# Patient Record
Sex: MALE | Race: WHITE | NOT HISPANIC OR LATINO | Employment: UNEMPLOYED | URBAN - METROPOLITAN AREA
[De-identification: names, ages, dates, MRNs, and addresses within clinical notes are randomized per-mention and may not be internally consistent; named-entity substitution may affect disease eponyms.]

---

## 2017-09-02 ENCOUNTER — HOSPITAL ENCOUNTER (EMERGENCY)
Facility: HOSPITAL | Age: 32
Discharge: HOME | End: 2017-09-02
Attending: EMERGENCY MEDICINE
Payer: MEDICAID

## 2017-09-02 VITALS
TEMPERATURE: 98 F | RESPIRATION RATE: 18 BRPM | WEIGHT: 182 LBS | SYSTOLIC BLOOD PRESSURE: 128 MMHG | HEART RATE: 83 BPM | HEIGHT: 72 IN | BODY MASS INDEX: 24.65 KG/M2 | DIASTOLIC BLOOD PRESSURE: 77 MMHG | OXYGEN SATURATION: 100 %

## 2017-09-02 DIAGNOSIS — R05.9 COUGH: ICD-10-CM

## 2017-09-02 DIAGNOSIS — F19.10 POLYSUBSTANCE ABUSE: Primary | ICD-10-CM

## 2017-09-02 LAB
ALBUMIN SERPL BCP-MCNC: 3.7 G/DL
ALP SERPL-CCNC: 212 U/L
ALT SERPL W/O P-5'-P-CCNC: 84 U/L
ANION GAP SERPL CALC-SCNC: 14 MMOL/L
APAP SERPL-MCNC: <3 UG/ML
AST SERPL-CCNC: 46 U/L
BASOPHILS # BLD AUTO: 0.03 K/UL
BASOPHILS NFR BLD: 0.3 %
BILIRUB SERPL-MCNC: 0.4 MG/DL
BUN SERPL-MCNC: 13 MG/DL
CALCIUM SERPL-MCNC: 9.4 MG/DL
CHLORIDE SERPL-SCNC: 101 MMOL/L
CO2 SERPL-SCNC: 23 MMOL/L
CREAT SERPL-MCNC: 0.9 MG/DL
DIFFERENTIAL METHOD: ABNORMAL
EOSINOPHIL # BLD AUTO: 0.2 K/UL
EOSINOPHIL NFR BLD: 1.8 %
ERYTHROCYTE [DISTWIDTH] IN BLOOD BY AUTOMATED COUNT: 13 %
EST. GFR  (AFRICAN AMERICAN): >60 ML/MIN/1.73 M^2
EST. GFR  (NON AFRICAN AMERICAN): >60 ML/MIN/1.73 M^2
ETHANOL SERPL-MCNC: <10 MG/DL
GLUCOSE SERPL-MCNC: 147 MG/DL
HCT VFR BLD AUTO: 38.6 %
HGB BLD-MCNC: 13.6 G/DL
LYMPHOCYTES # BLD AUTO: 3.2 K/UL
LYMPHOCYTES NFR BLD: 33.1 %
MCH RBC QN AUTO: 30.5 PG
MCHC RBC AUTO-ENTMCNC: 35.2 G/DL
MCV RBC AUTO: 87 FL
MONOCYTES # BLD AUTO: 0.7 K/UL
MONOCYTES NFR BLD: 6.7 %
NEUTROPHILS # BLD AUTO: 5.6 K/UL
NEUTROPHILS NFR BLD: 57.9 %
PLATELET # BLD AUTO: 310 K/UL
PMV BLD AUTO: 8.8 FL
POTASSIUM SERPL-SCNC: 3.8 MMOL/L
PROT SERPL-MCNC: 7.7 G/DL
RBC # BLD AUTO: 4.46 M/UL
SODIUM SERPL-SCNC: 138 MMOL/L
TSH SERPL DL<=0.005 MIU/L-ACNC: 0.49 UIU/ML
WBC # BLD AUTO: 9.73 K/UL

## 2017-09-02 PROCEDURE — 99284 EMERGENCY DEPT VISIT MOD MDM: CPT | Mod: ,,, | Performed by: EMERGENCY MEDICINE

## 2017-09-02 PROCEDURE — 94640 AIRWAY INHALATION TREATMENT: CPT

## 2017-09-02 PROCEDURE — 84443 ASSAY THYROID STIM HORMONE: CPT

## 2017-09-02 PROCEDURE — 85025 COMPLETE CBC W/AUTO DIFF WBC: CPT

## 2017-09-02 PROCEDURE — 80053 COMPREHEN METABOLIC PANEL: CPT

## 2017-09-02 PROCEDURE — 25000242 PHARM REV CODE 250 ALT 637 W/ HCPCS: Performed by: RADIOLOGY

## 2017-09-02 PROCEDURE — 99284 EMERGENCY DEPT VISIT MOD MDM: CPT

## 2017-09-02 PROCEDURE — 80329 ANALGESICS NON-OPIOID 1 OR 2: CPT

## 2017-09-02 PROCEDURE — 80320 DRUG SCREEN QUANTALCOHOLS: CPT

## 2017-09-02 RX ORDER — DICYCLOMINE HYDROCHLORIDE 20 MG/1
20 TABLET ORAL 2 TIMES DAILY
Qty: 30 TABLET | Refills: 1 | Status: SHIPPED | OUTPATIENT
Start: 2017-09-02 | End: 2017-10-02

## 2017-09-02 RX ORDER — IPRATROPIUM BROMIDE AND ALBUTEROL SULFATE 2.5; .5 MG/3ML; MG/3ML
3 SOLUTION RESPIRATORY (INHALATION)
Status: COMPLETED | OUTPATIENT
Start: 2017-09-02 | End: 2017-09-02

## 2017-09-02 RX ORDER — AMITRIPTYLINE HYDROCHLORIDE 50 MG/1
TABLET, FILM COATED ORAL NIGHTLY
COMMUNITY

## 2017-09-02 RX ORDER — LORAZEPAM 1 MG/1
1 TABLET ORAL 3 TIMES DAILY
COMMUNITY

## 2017-09-02 RX ORDER — CHLORDIAZEPOXIDE HYDROCHLORIDE 25 MG/1
50 CAPSULE, GELATIN COATED ORAL
Qty: 10 CAPSULE | Refills: 0 | Status: SHIPPED | OUTPATIENT
Start: 2017-09-03 | End: 2017-09-06

## 2017-09-02 RX ORDER — DIPHENHYDRAMINE HCL 25 MG
75 TABLET ORAL NIGHTLY
COMMUNITY

## 2017-09-02 RX ORDER — ONDANSETRON 4 MG/1
4 TABLET, FILM COATED ORAL EVERY 8 HOURS PRN
Qty: 18 TABLET | Refills: 0 | Status: SHIPPED | OUTPATIENT
Start: 2017-09-02

## 2017-09-02 RX ADMIN — IPRATROPIUM BROMIDE AND ALBUTEROL SULFATE 3 ML: .5; 3 SOLUTION RESPIRATORY (INHALATION) at 08:09

## 2017-09-03 NOTE — DISCHARGE INSTRUCTIONS
STOP USING HEROIN AND COCAINE IMMEDIATELY  DECREASE ALCOHOL INTAKE BY 1/2 EACH DAY   TAKE LIBRIUM ONLY IF YOU ARE NOT USING ALCOHOL   FOLLOW UP WITH REHAB FACILITY

## 2017-09-03 NOTE — ED PROVIDER NOTES
"Encounter Date: 9/2/2017       History     Chief Complaint   Patient presents with    Addiction Problem     Pt states that he needs to detox off of heroin. Last use was 1 hour ago. States that he had one beer today. Pt states that he has been mixing cocaine with the heroin. Pt states that he has thoughts of killing himself but that he would not "go through with it". Pt's mother states that she is concerned for his safety     Mr. Saurav Akhtar is a pleasant 32 year old male with past medical history significant for polysubstance abuse and major depressive disorder presents to the [facility] ED with a chief complaint of "wanting to detox from opiates". Patient states that he has recently taken cocaine, heroin, benzodiazepines and beer. He has abused these for about the past 8 months. Patient states that he is depressed over pain in his back and left knee as well as decreased mobility and not having a car. 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 Notably, patient denies suicidal ideation, homicidal ideation, visual or auditory hallucination. Patient states he cannot sleep without the help of benzodiazepine medication. Denies chest pain, fever, chills, abdominal pain, headache or dizziness. He endorses cough productive of yellow sputum and mild shortness of breath at rest. Patient smokes cigarettes.       The history is provided by the patient.     Review of patient's allergies indicates:  No Known Allergies  Past Medical History:   Diagnosis Date    Chronic back pain     Depression     Drug withdrawal seizure without complication     Opiate addiction      Past Surgical History:   Procedure Laterality Date    Left knee surgery      TESTICLE SURGERY       No family history on file.  Social History   Substance Use Topics    Smoking status: Current Every Day Smoker     Types: Cigarettes    Smokeless tobacco: Current User    Alcohol use Yes      Comment: Daily     Review of Systems   Constitutional: Negative " for chills, diaphoresis, fatigue and fever.   HENT: Negative for congestion, dental problem and sore throat.    Eyes: Negative for pain and redness.   Respiratory: Positive for cough and wheezing. Negative for apnea, chest tightness and stridor.    Cardiovascular: Negative for chest pain and palpitations.   Gastrointestinal: Negative for abdominal distention, abdominal pain, constipation, diarrhea and vomiting.   Endocrine: Negative for polyuria.   Genitourinary: Negative for dysuria.   Musculoskeletal: Positive for back pain and gait problem. Negative for joint swelling and neck pain.   Skin: Negative for rash and wound.   Allergic/Immunologic: Negative for immunocompromised state.   Neurological: Negative for dizziness and headaches.   Hematological: Negative for adenopathy.   Psychiatric/Behavioral: Positive for dysphoric mood and sleep disturbance. Negative for agitation, confusion, hallucinations, self-injury and suicidal ideas. The patient is not nervous/anxious.        Physical Exam     Initial Vitals [09/02/17 1933]   BP Pulse Resp Temp SpO2   128/77 (!) 115 18 98 °F (36.7 °C) 97 %      MAP       94         Physical Exam    Nursing note and vitals reviewed.  Constitutional: He appears well-developed and well-nourished. He is not diaphoretic. No distress.   HENT:   Head: Normocephalic and atraumatic.   Nose: Nose normal.   Mouth/Throat: No oropharyngeal exudate.   Eyes: EOM are normal. Pupils are equal, round, and reactive to light. No scleral icterus.   Neck: Normal range of motion. Neck supple. No JVD present.   Cardiovascular: Regular rhythm. Tachycardia present.  Exam reveals no friction rub.    No murmur heard.  Pulmonary/Chest: No respiratory distress. He has wheezes. He has no rales. He exhibits no tenderness.   Abdominal: Soft. Bowel sounds are normal. He exhibits no distension. There is no tenderness.   Musculoskeletal: He exhibits no edema or tenderness.        Left knee: He exhibits decreased range  of motion and deformity. He exhibits no laceration and no erythema. No tenderness found.   Lymphadenopathy:     He has no cervical adenopathy.   Neurological: He is alert and oriented to person, place, and time. He has normal strength.   Skin: Skin is warm and dry. No erythema.   Psychiatric: His speech is normal and behavior is normal. His mood appears not anxious. His affect is not angry and not inappropriate. He is not actively hallucinating. Thought content is not delusional. Cognition and memory are normal. He exhibits a depressed mood. He expresses no homicidal and no suicidal ideation. He expresses no suicidal plans and no homicidal plans.         ED Course   Procedures  Labs Reviewed   CBC W/ AUTO DIFFERENTIAL - Abnormal; Notable for the following:        Result Value    RBC 4.46 (*)     Hemoglobin 13.6 (*)     Hematocrit 38.6 (*)     MPV 8.8 (*)     All other components within normal limits    Narrative:     CAMPO NOT ON ICE   COMPREHENSIVE METABOLIC PANEL - Abnormal; Notable for the following:     Glucose 147 (*)     Alkaline Phosphatase 212 (*)     AST 46 (*)     ALT 84 (*)     All other components within normal limits    Narrative:     CAMPO NOT ON ICE   ACETAMINOPHEN LEVEL - Abnormal; Notable for the following:     Acetaminophen (Tylenol), Serum <3.0 (*)     All other components within normal limits    Narrative:     GRAY NOT ON ICE   TSH    Narrative:     CAMPO NOT ON ICE   ALCOHOL,MEDICAL (ETHANOL)    Narrative:     CAMPO NOT ON ICE             Medical Decision Making:   Initial Assessment:   Emergent evaluation of a 32 year old male with aforementioned past medical history presenting with depression and polysubstance abuse; patient seeking detox from drugs including opiates. At this time patient demonstrates no desire to hurt himself or others and does not appear to be responding to internal stimuli. Denies hallucinations of any kind. I have no reason to doubt the patient at this time. Patient also with  complaint of productive cough and wheezing. It is possible that this individual has a pneumonia and/or asthma.   Differential Diagnosis:   Major depressive disorder, polysubstance abuse.  Clinical Tests:   Lab Tests: Ordered  Radiological Study: Ordered  ED Management:  I will order a psychiatric workup for this patient including CBC, CMP, TSH, UA, drug panel, ethanol level, tylenol level, CXR and will order a DuoNeb for this patient's wheezing. I will reassess the patient. No need for a PEC at this time.  Other:   I discussed test(s) with the performing physician.       APC / Resident Notes:   9:21 PM  Labs have returned and appear unremarkable. Patient will be discharged with a prescription for Zofran prn, Bentyl and a Librium taper. Patient will follow up with outpatient pysch/rehab. Plan of care communicated with the patient and family.         Attending Attestation:   Physician Attestation Statement for Resident:  As the supervising MD   Physician Attestation Statement: I have personally seen and examined this patient.   I agree with the above history. -:   As the supervising MD I agree with the above PE.    As the supervising MD I agree with the above treatment, course, plan, and disposition.  I have reviewed and agree with the residents interpretation of the following: lab data.  I have reviewed the following: old records at this facility.            Attending ED Notes:   Patient is evaluated with his mother for long-standing polysubstance abuse.  There has been no suicidal gestures.  The mother does not feel that the patient is a threat to himself specifically, outside of his long-standing drug abuse.  He has been to detox multiple times in a rehabilitation facilities multiple times.  He is currently using heroin, cocaine, alcohol and benzodiazepines.  He has used Suboxone in the past.  I have had a long discussion with the patient and his mother.  I do not feel he meets PEC criteria.  I have discussed  alcohol withdrawal.,  He has never had prior issues with a complicated alcohol withdrawal.  I will write a Librium taper prescription, but informed the mother that if she continues to use alcohol and takes a Librium this could result in life threatening consequences of respiratory depression.  I do not get the impression that the patient himself is significantly committed to sobriety. Outpatient resources provided.           ED Course      Clinical Impression:   The primary encounter diagnosis was Polysubstance abuse. A diagnosis of Cough was also pertinent to this visit.    Disposition:   Disposition: Discharged  Condition: Stable                        Jem Hoskins MD  Resident  09/02/17 2237       Amy Vazquez MD  09/04/17 4236

## 2017-09-03 NOTE — ED TRIAGE NOTES
Pt to ER for assistance with detox from heroin, last used approximately 1 hour before arrival to ER. Pt also expressed concern for worsening depression. Denies SI or HI

## 2017-09-03 NOTE — ED NOTES
Pt is expressing concerns about having a code watch called. Pt states that he is not suicidal. Pt states that he does not want to go to the psych [facility]uj[facility]. Pt states that he is not crazy, he is not going to kill himself and the his mother should not have said anything about him being a safety risk. Pt states that he is concerned about how this is being handled. States that he would would like to go to another hospital. Pt informed that he cannot leave at this time. Security and the Charge Nurse notified.

## 2017-09-03 NOTE — ED PROVIDER NOTES
"Encounter Date: 9/2/2017       History     Chief Complaint   Patient presents with    Addiction Problem     Pt states that he needs to detox off of heroin. Last use was 1 hour ago. States that he had one beer today. Pt states that he has been mixing cocaine with the heroin. Pt states that he has thoughts of killing himself but that he would not "go through with it". Pt's mother states that she is concerned for his safety     HPI     This is an assumption of care note for a 32 y.o. male was seen on the previous shift for help w/ addiction.     Review of patient's allergies indicates:  No Known Allergies  Past Medical History:   Diagnosis Date    Chronic back pain     Depression     Drug withdrawal seizure without complication     Opiate addiction      Past Surgical History:   Procedure Laterality Date    Left knee surgery      TESTICLE SURGERY       No family history on file.  Social History   Substance Use Topics    Smoking status: Current Every Day Smoker     Types: Cigarettes    Smokeless tobacco: Current User    Alcohol use Yes      Comment: Daily     Review of Systems    Physical Exam     Initial Vitals [09/02/17 1933]   BP Pulse Resp Temp SpO2   128/77 (!) 115 18 98 °F (36.7 °C) 97 %      MAP       94         Physical Exam    ED Course   Procedures  Labs Reviewed   CBC W/ AUTO DIFFERENTIAL - Abnormal; Notable for the following:        Result Value    RBC 4.46 (*)     Hemoglobin 13.6 (*)     Hematocrit 38.6 (*)     MPV 8.8 (*)     All other components within normal limits    Narrative:     CAMPO NOT ON ICE   COMPREHENSIVE METABOLIC PANEL - Abnormal; Notable for the following:     Glucose 147 (*)     Alkaline Phosphatase 212 (*)     AST 46 (*)     ALT 84 (*)     All other components within normal limits    Narrative:     CAMPO NOT ON ICE   ACETAMINOPHEN LEVEL - Abnormal; Notable for the following:     Acetaminophen (Tylenol), Serum <3.0 (*)     All other components within normal limits    Narrative:     " CAMPO NOT ON ICE   TSH    Narrative:     CAMPO NOT ON ICE   ALCOHOL,MEDICAL (ETHANOL)    Narrative:     CAMPO NOT ON ICE                   APC / Resident Notes:   PGY-1 [place] of Care:    I have assumed care of this patient from the off going physician with whom I have discussed the case. The patient has been seen and examined by me and I agree with the medical record and the plan for treatment.    AOC summary:    32 y.o. male was seen on the previous shift for help w/ addiction.     His entire workup was essentially completed.  Disposition plan completed, w/ dc pending final workup.  I agree with the workup, physical exam, and plan by the initial provider.  I found no acute changes to his physical exam and feel the patient is of sound mental state to return home.    On completion of his workup (labs being normal) he will be discharged on outpatient detox regimen and community follow up.     VS have been within normal limits.    Forrest Daniel MD  Emergency Medicine PGY-1  9:43 PM 9/2/2017              ED Course      Clinical Impression:   {Add your Clinical Impression here. If you haven't documented one yet, please pend the note, finalize a Clinical Impression, and refresh your note before signing.:[id number]}

## 2023-02-23 ENCOUNTER — HOSPITAL ENCOUNTER (EMERGENCY)
Facility: OTHER | Age: 38
Discharge: HOME | End: 2023-02-23
Attending: EMERGENCY MEDICINE
Payer: MEDICAID

## 2023-02-23 VITALS
RESPIRATION RATE: 18 BRPM | HEIGHT: 73 IN | BODY MASS INDEX: 23.86 KG/M2 | WEIGHT: 180 LBS | HEART RATE: 109 BPM | TEMPERATURE: 99 F | OXYGEN SATURATION: 97 % | DIASTOLIC BLOOD PRESSURE: 92 MMHG | SYSTOLIC BLOOD PRESSURE: 134 MMHG

## 2023-02-23 DIAGNOSIS — T50.905A MEDICATION REACTION, INITIAL ENCOUNTER: Primary | ICD-10-CM

## 2023-02-23 PROCEDURE — 99284 EMERGENCY DEPT VISIT MOD MDM: CPT | Mod: 25

## 2023-02-23 PROCEDURE — 96372 THER/PROPH/DIAG INJ SC/IM: CPT | Performed by: EMERGENCY MEDICINE

## 2023-02-23 PROCEDURE — 25000003 PHARM REV CODE 250: Performed by: EMERGENCY MEDICINE

## 2023-02-23 PROCEDURE — 63600175 PHARM REV CODE 636 W HCPCS: Performed by: EMERGENCY MEDICINE

## 2023-02-23 RX ORDER — DIAZEPAM 5 MG/1
5 TABLET ORAL
Status: COMPLETED | OUTPATIENT
Start: 2023-02-23 | End: 2023-02-23

## 2023-02-23 RX ORDER — DULOXETIN HYDROCHLORIDE 60 MG/1
60 CAPSULE, DELAYED RELEASE ORAL
COMMUNITY
Start: 2023-01-30

## 2023-02-23 RX ORDER — BUPRENORPHINE HYDROCHLORIDE, NALOXONE HYDROCHLORIDE 8; 2 MG/1; MG/1
FILM, SOLUBLE BUCCAL; SUBLINGUAL 2 TIMES DAILY
COMMUNITY
Start: 2023-01-31

## 2023-02-23 RX ORDER — KETOROLAC TROMETHAMINE 30 MG/ML
30 INJECTION, SOLUTION INTRAMUSCULAR; INTRAVENOUS
Status: COMPLETED | OUTPATIENT
Start: 2023-02-23 | End: 2023-02-23

## 2023-02-23 RX ORDER — LORAZEPAM 1 MG/1
1 TABLET ORAL
Status: DISCONTINUED | OUTPATIENT
Start: 2023-02-23 | End: 2023-02-23

## 2023-02-23 RX ORDER — GABAPENTIN 300 MG/1
300 CAPSULE ORAL NIGHTLY
COMMUNITY
Start: 2023-01-30

## 2023-02-23 RX ORDER — TESTOSTERONE CYPIONATE 200 MG/ML
INJECTION, SOLUTION INTRAMUSCULAR
COMMUNITY
Start: 2023-01-30

## 2023-02-23 RX ORDER — CLONAZEPAM 1 MG/1
0.5 TABLET ORAL 3 TIMES DAILY
COMMUNITY
Start: 2023-01-31

## 2023-02-23 RX ORDER — IBUPROFEN 800 MG/1
800 TABLET ORAL
COMMUNITY
Start: 2023-01-30

## 2023-02-23 RX ADMIN — DIAZEPAM 5 MG: 5 TABLET ORAL at 07:02

## 2023-02-23 RX ADMIN — KETOROLAC TROMETHAMINE 30 MG: 30 INJECTION, SOLUTION INTRAMUSCULAR; INTRAVENOUS at 07:02

## 2023-02-23 NOTE — DISCHARGE INSTRUCTIONS
Be cautious about taking too many of these pills.  They can cause agitation and other symptoms that you are experiencing.

## 2023-02-23 NOTE — ED TRIAGE NOTES
Pt reports taking OTC male enhancement supplements x2 doses yd evening. Pt reports has has been experiencing nause, jitters, and insomnia. Pt decided to come to the ER to be evaluate. Pt AAOx4, NADN at this time.

## 2023-02-23 NOTE — ED PROVIDER NOTES
"SCRIBE #1 NOTE: I, Tyson Garrettbaum, am scribing for, and in the presence of,  Shawn Durand DO.       Source of History:  Patient    Chief complaint:  Medication Reaction (Pt states he took an gas station "male enhancement" pill approx 8pm and then another one purchased from [employer] at approx 10pm. Pt states he was feeling nauseated but is now feeling jittery. )      HPI:  Saurav Akhtar Jr. is a 37 y.o. male presenting with medication reaction starting last night. Patient states that he took one dose of Blue Rhino liquid shot for increased libido 10 hours ago. He reports taking one dose of OTC Ageless Male Tonight XL from [employer] 8 hours ago due to the ineffectiveness of the liquid shot. Patient denies any prior complications with Blue Rhino liquid shots. He reports current associated jitteriness, anxiety, intermittent nausea, difficulty sleeping, and increased urinary frequency. He denies vomiting. Patient notes that he wants a steroid injection for his chronic back pain. He states that he used to receive steroid injections but has ceased starting over one year ago.     This is the extent to the patients complaints today here in the emergency department.    ROS:   See HPI.    Review of patient's allergies indicates:  No Known Allergies    PMH:  As per HPI and below:  Past Medical History:   Diagnosis Date    Chronic back pain     Depression     Drug withdrawal seizure without complication     Opiate addiction      Past Surgical History:   Procedure Laterality Date    Left knee surgery      TESTICLE SURGERY         Social History     Tobacco Use    Smoking status: Every Day     Types: Cigarettes     Passive exposure: Never    Smokeless tobacco: Current   Substance Use Topics    Alcohol use: Yes     Comment: occasional    Drug use: Yes     Types: IV, Cocaine     Comment: Last used 9/2/17       Physical Exam:    BP (!) 134/92 (BP Location: Left arm, Patient Position: Sitting)   Pulse 109   Temp 99.2 °F " "(37.3 °C) (Oral)   Resp 18   Ht 6' 1" (1.854 m)   Wt 81.6 kg (180 lb)   SpO2 97%   BMI 23.75 kg/m²   Nursing note and vital signs reviewed.  Constitutional: No acute distress.  Nontoxic  Cardiovascular: Regular rate and rhythm.  No murmurs. No gallops. No rubs  Respiratory: Clear to auscultation bilaterally.  Good air movement.  No wheezes.  No rhonchi. No rales. No accessory muscle use.  Abdomen:  Soft. No tenderness. No rebound. No guarding.  Neuro: Alert. No focal deficits.  Skin: No rashes seen.     I decided to obtain the patient's medical records.  Summary of Medical Records:      MDM/ Differential Dx:   37-year-old male presents feeling agitated after taking multiple male enhancement pills.  Symptoms that ear having are suggestive of just taking too much.  They can cause agitation dizziness and jitteriness.  Patient's vital signs are otherwise normal and he is in no distress.  Will give Ativan.  He states he is also been chronic back pain which will give a shot of Toradol and then plan for discharge.  Do not feel any further workup is indicated at this time.        ED Course as of 02/23/23 0714   u Feb 23, 2023   0711 No further workup is indicated in the emergency department today.  I updated pt regarding results and I counseled pt regarding supportive care measures.  Diagnosis and treatment plan explained to patient. I have answered all questions and the patient is satisfied with the plan of care. Patient discharged home in stable condition.  [SM]      ED Course User Index  [SM] Shawn Durand, DO              Scribe Attestation:   Scribe #1: I performed the above scribed service and the documentation accurately describes the services I performed. I attest to the accuracy of the note.    Physician Attestation for Scribe: I, Dr Shawn Durand, reviewed documentation as scribed in my presence, which is both accurate and complete.    Diagnostic Impression:    1. Medication reaction, initial " encounter         ED Disposition Condition    Discharge Stable            ED Prescriptions    None       Follow-up Information       Follow up With Specialties Details Why Contact Info    [facility]    [address] [facility] [address]  [phone]               Shawn Durand,   02/23/23 0714